# Patient Record
Sex: MALE | Race: WHITE | NOT HISPANIC OR LATINO | Employment: UNEMPLOYED | URBAN - METROPOLITAN AREA
[De-identification: names, ages, dates, MRNs, and addresses within clinical notes are randomized per-mention and may not be internally consistent; named-entity substitution may affect disease eponyms.]

---

## 2018-03-20 ENCOUNTER — RESOLUTE PROFESSIONAL BILLING HOSPITAL PROF FEE (OUTPATIENT)
Dept: HOSPITALIST | Facility: MEDICAL CENTER | Age: 63
End: 2018-03-20
Payer: COMMERCIAL

## 2018-03-20 ENCOUNTER — APPOINTMENT (OUTPATIENT)
Dept: RADIOLOGY | Facility: MEDICAL CENTER | Age: 63
DRG: 872 | End: 2018-03-20
Attending: EMERGENCY MEDICINE
Payer: COMMERCIAL

## 2018-03-20 ENCOUNTER — HOSPITAL ENCOUNTER (INPATIENT)
Facility: MEDICAL CENTER | Age: 63
LOS: 1 days | DRG: 872 | End: 2018-03-21
Attending: EMERGENCY MEDICINE | Admitting: INTERNAL MEDICINE
Payer: COMMERCIAL

## 2018-03-20 DIAGNOSIS — D69.6 THROMBOCYTOPENIA (HCC): ICD-10-CM

## 2018-03-20 DIAGNOSIS — D64.9 ANEMIA, UNSPECIFIED TYPE: ICD-10-CM

## 2018-03-20 PROBLEM — C85.90 LYMPHOMA (HCC): Status: ACTIVE | Noted: 2018-03-20

## 2018-03-20 PROBLEM — A41.9 SEPSIS (HCC): Status: ACTIVE | Noted: 2018-03-20

## 2018-03-20 PROBLEM — I95.9 HYPOTENSION: Status: ACTIVE | Noted: 2018-03-20

## 2018-03-20 PROBLEM — D61.818 PANCYTOPENIA (HCC): Status: ACTIVE | Noted: 2018-03-20

## 2018-03-20 LAB
ABO GROUP BLD: NORMAL
ABO GROUP BLD: NORMAL
ALBUMIN SERPL BCP-MCNC: 3.1 G/DL (ref 3.2–4.9)
ALBUMIN/GLOB SERPL: 1.2 G/DL
ALP SERPL-CCNC: 82 U/L (ref 30–99)
ALT SERPL-CCNC: 28 U/L (ref 2–50)
ANION GAP SERPL CALC-SCNC: 5 MMOL/L (ref 0–11.9)
AST SERPL-CCNC: 18 U/L (ref 12–45)
BARCODED ABORH UBTYP: 9500
BARCODED PRD CODE UBPRD: NORMAL
BARCODED UNIT NUM UBUNT: NORMAL
BASOPHILS # BLD AUTO: 0 % (ref 0–1.8)
BASOPHILS # BLD: 0 K/UL (ref 0–0.12)
BILIRUB SERPL-MCNC: 1.2 MG/DL (ref 0.1–1.5)
BLD GP AB INVEST PLASRBC-IMP: NORMAL
BLD GP AB SCN SERPL QL: NORMAL
BUN SERPL-MCNC: 16 MG/DL (ref 8–22)
CALCIUM SERPL-MCNC: 8.6 MG/DL (ref 8.4–10.2)
CHLORIDE SERPL-SCNC: 105 MMOL/L (ref 96–112)
CO2 SERPL-SCNC: 26 MMOL/L (ref 20–33)
COMPONENT R 8504R: NORMAL
CREAT SERPL-MCNC: 0.75 MG/DL (ref 0.5–1.4)
EOSINOPHIL # BLD AUTO: 0.02 K/UL (ref 0–0.51)
EOSINOPHIL NFR BLD: 1.3 % (ref 0–6.9)
ERYTHROCYTE [DISTWIDTH] IN BLOOD BY AUTOMATED COUNT: 58.8 FL (ref 35.9–50)
GLOBULIN SER CALC-MCNC: 2.5 G/DL (ref 1.9–3.5)
GLUCOSE SERPL-MCNC: 94 MG/DL (ref 65–99)
HCT VFR BLD AUTO: 19.5 % (ref 42–52)
HGB BLD-MCNC: 6.3 G/DL (ref 14–18)
IMM GRANULOCYTES # BLD AUTO: 0.4 K/UL (ref 0–0.11)
IMM GRANULOCYTES NFR BLD AUTO: 26 % (ref 0–0.9)
LYMPHOCYTES # BLD AUTO: 0.36 K/UL (ref 1–4.8)
LYMPHOCYTES NFR BLD: 23.4 % (ref 22–41)
MCH RBC QN AUTO: 29.2 PG (ref 27–33)
MCHC RBC AUTO-ENTMCNC: 32.3 G/DL (ref 33.7–35.3)
MCV RBC AUTO: 90.3 FL (ref 81.4–97.8)
MONOCYTES # BLD AUTO: 0.37 K/UL (ref 0–0.85)
MONOCYTES NFR BLD AUTO: 24 % (ref 0–13.4)
NEUTROPHILS # BLD AUTO: 0.39 K/UL (ref 1.82–7.42)
NEUTROPHILS NFR BLD: 25.3 % (ref 44–72)
NRBC # BLD AUTO: 0.02 K/UL
NRBC BLD-RTO: 1.3 /100 WBC
PLATELET # BLD AUTO: 17 K/UL (ref 164–446)
PMV BLD AUTO: 10.4 FL (ref 9–12.9)
POTASSIUM SERPL-SCNC: 3.9 MMOL/L (ref 3.6–5.5)
PRODUCT TYPE UPROD: NORMAL
PROT SERPL-MCNC: 5.6 G/DL (ref 6–8.2)
RBC # BLD AUTO: 2.16 M/UL (ref 4.7–6.1)
RH BLD: NORMAL
RH BLD: NORMAL
SODIUM SERPL-SCNC: 136 MMOL/L (ref 135–145)
UNIT STATUS USTAT: NORMAL
WBC # BLD AUTO: 1.6 K/UL (ref 4.8–10.8)

## 2018-03-20 PROCEDURE — 700102 HCHG RX REV CODE 250 W/ 637 OVERRIDE(OP)

## 2018-03-20 PROCEDURE — 36415 COLL VENOUS BLD VENIPUNCTURE: CPT

## 2018-03-20 PROCEDURE — A9270 NON-COVERED ITEM OR SERVICE: HCPCS | Performed by: EMERGENCY MEDICINE

## 2018-03-20 PROCEDURE — 700111 HCHG RX REV CODE 636 W/ 250 OVERRIDE (IP): Performed by: INTERNAL MEDICINE

## 2018-03-20 PROCEDURE — 700105 HCHG RX REV CODE 258

## 2018-03-20 PROCEDURE — 85025 COMPLETE CBC W/AUTO DIFF WBC: CPT

## 2018-03-20 PROCEDURE — 99291 CRITICAL CARE FIRST HOUR: CPT

## 2018-03-20 PROCEDURE — 700102 HCHG RX REV CODE 250 W/ 637 OVERRIDE(OP): Performed by: EMERGENCY MEDICINE

## 2018-03-20 PROCEDURE — 80053 COMPREHEN METABOLIC PANEL: CPT

## 2018-03-20 PROCEDURE — 99291 CRITICAL CARE FIRST HOUR: CPT | Performed by: INTERNAL MEDICINE

## 2018-03-20 PROCEDURE — 86900 BLOOD TYPING SEROLOGIC ABO: CPT

## 2018-03-20 PROCEDURE — A9270 NON-COVERED ITEM OR SERVICE: HCPCS | Performed by: INTERNAL MEDICINE

## 2018-03-20 PROCEDURE — 86870 RBC ANTIBODY IDENTIFICATION: CPT

## 2018-03-20 PROCEDURE — 86850 RBC ANTIBODY SCREEN: CPT

## 2018-03-20 PROCEDURE — 700105 HCHG RX REV CODE 258: Performed by: INTERNAL MEDICINE

## 2018-03-20 PROCEDURE — 700102 HCHG RX REV CODE 250 W/ 637 OVERRIDE(OP): Performed by: INTERNAL MEDICINE

## 2018-03-20 PROCEDURE — 770022 HCHG ROOM/CARE - ICU (200)

## 2018-03-20 PROCEDURE — 700111 HCHG RX REV CODE 636 W/ 250 OVERRIDE (IP)

## 2018-03-20 PROCEDURE — A9270 NON-COVERED ITEM OR SERVICE: HCPCS

## 2018-03-20 PROCEDURE — 86901 BLOOD TYPING SEROLOGIC RH(D): CPT

## 2018-03-20 PROCEDURE — 700105 HCHG RX REV CODE 258: Performed by: EMERGENCY MEDICINE

## 2018-03-20 RX ORDER — PROMETHAZINE HYDROCHLORIDE 25 MG/1
12.5-25 TABLET ORAL EVERY 4 HOURS PRN
Status: DISCONTINUED | OUTPATIENT
Start: 2018-03-20 | End: 2018-03-21 | Stop reason: HOSPADM

## 2018-03-20 RX ORDER — DIPHENHYDRAMINE HYDROCHLORIDE 50 MG/ML
25 INJECTION INTRAMUSCULAR; INTRAVENOUS ONCE
Status: COMPLETED | OUTPATIENT
Start: 2018-03-20 | End: 2018-03-20

## 2018-03-20 RX ORDER — SODIUM CHLORIDE 9 MG/ML
1000 INJECTION, SOLUTION INTRAVENOUS ONCE
Status: COMPLETED | OUTPATIENT
Start: 2018-03-20 | End: 2018-03-20

## 2018-03-20 RX ORDER — ACYCLOVIR 400 MG/1
400 TABLET ORAL 2 TIMES DAILY
COMMUNITY

## 2018-03-20 RX ORDER — SODIUM CHLORIDE 9 MG/ML
30 INJECTION, SOLUTION INTRAVENOUS
Status: DISCONTINUED | OUTPATIENT
Start: 2018-03-20 | End: 2018-03-21 | Stop reason: HOSPADM

## 2018-03-20 RX ORDER — ONDANSETRON 4 MG/1
4 TABLET, ORALLY DISINTEGRATING ORAL EVERY 4 HOURS PRN
Status: DISCONTINUED | OUTPATIENT
Start: 2018-03-20 | End: 2018-03-21 | Stop reason: HOSPADM

## 2018-03-20 RX ORDER — AMOXICILLIN 250 MG
2 CAPSULE ORAL 2 TIMES DAILY
Status: DISCONTINUED | OUTPATIENT
Start: 2018-03-20 | End: 2018-03-21 | Stop reason: HOSPADM

## 2018-03-20 RX ORDER — ONDANSETRON 2 MG/ML
4 INJECTION INTRAMUSCULAR; INTRAVENOUS EVERY 4 HOURS PRN
Status: DISCONTINUED | OUTPATIENT
Start: 2018-03-20 | End: 2018-03-21 | Stop reason: HOSPADM

## 2018-03-20 RX ORDER — ACETAMINOPHEN 325 MG/1
650 TABLET ORAL EVERY 6 HOURS PRN
Status: DISCONTINUED | OUTPATIENT
Start: 2018-03-20 | End: 2018-03-21 | Stop reason: HOSPADM

## 2018-03-20 RX ORDER — ACYCLOVIR 200 MG/1
400 CAPSULE ORAL 2 TIMES DAILY
Status: DISCONTINUED | OUTPATIENT
Start: 2018-03-20 | End: 2018-03-21 | Stop reason: HOSPADM

## 2018-03-20 RX ORDER — SODIUM CHLORIDE 9 MG/ML
500 INJECTION, SOLUTION INTRAVENOUS
Status: DISCONTINUED | OUTPATIENT
Start: 2018-03-20 | End: 2018-03-21 | Stop reason: HOSPADM

## 2018-03-20 RX ORDER — POLYETHYLENE GLYCOL 3350 17 G/17G
1 POWDER, FOR SOLUTION ORAL
Status: DISCONTINUED | OUTPATIENT
Start: 2018-03-20 | End: 2018-03-21 | Stop reason: HOSPADM

## 2018-03-20 RX ORDER — PREDNISONE 5 MG/1
10 TABLET ORAL EVERY EVENING
Status: DISCONTINUED | OUTPATIENT
Start: 2018-03-20 | End: 2018-03-21 | Stop reason: HOSPADM

## 2018-03-20 RX ORDER — PREDNISONE 10 MG/1
10 TABLET ORAL EVERY EVENING
COMMUNITY

## 2018-03-20 RX ORDER — DIPHENHYDRAMINE HCL 25 MG
TABLET ORAL
Status: COMPLETED
Start: 2018-03-20 | End: 2018-03-20

## 2018-03-20 RX ORDER — SODIUM CHLORIDE 9 MG/ML
INJECTION, SOLUTION INTRAVENOUS CONTINUOUS
Status: DISCONTINUED | OUTPATIENT
Start: 2018-03-20 | End: 2018-03-21 | Stop reason: HOSPADM

## 2018-03-20 RX ORDER — BISACODYL 10 MG
10 SUPPOSITORY, RECTAL RECTAL
Status: DISCONTINUED | OUTPATIENT
Start: 2018-03-20 | End: 2018-03-21 | Stop reason: HOSPADM

## 2018-03-20 RX ORDER — ACETAMINOPHEN 325 MG/1
650 TABLET ORAL ONCE
Status: COMPLETED | OUTPATIENT
Start: 2018-03-20 | End: 2018-03-20

## 2018-03-20 RX ORDER — M-VIT,TX,IRON,MINS/CALC/FOLIC 27MG-0.4MG
1 TABLET ORAL 2 TIMES DAILY
COMMUNITY

## 2018-03-20 RX ORDER — PROMETHAZINE HYDROCHLORIDE 25 MG/1
12.5-25 SUPPOSITORY RECTAL EVERY 4 HOURS PRN
Status: DISCONTINUED | OUTPATIENT
Start: 2018-03-20 | End: 2018-03-21 | Stop reason: HOSPADM

## 2018-03-20 RX ADMIN — ACYCLOVIR 400 MG: 200 CAPSULE ORAL at 22:15

## 2018-03-20 RX ADMIN — DIPHENHYDRAMINE HCL 25 MG: 25 TABLET ORAL at 23:48

## 2018-03-20 RX ADMIN — SODIUM CHLORIDE 1000 ML: 9 INJECTION, SOLUTION INTRAVENOUS at 19:07

## 2018-03-20 RX ADMIN — ACETAMINOPHEN 650 MG: 325 TABLET, FILM COATED ORAL at 23:47

## 2018-03-20 RX ADMIN — PIPERACILLIN SODIUM AND TAZOBACTAM SODIUM 3.38 G: 3; .375 INJECTION, POWDER, FOR SOLUTION INTRAVENOUS at 23:55

## 2018-03-20 RX ADMIN — SODIUM CHLORIDE: 9 INJECTION, SOLUTION INTRAVENOUS at 21:06

## 2018-03-20 RX ADMIN — PREDNISONE 10 MG: 5 TABLET ORAL at 22:15

## 2018-03-20 ASSESSMENT — ENCOUNTER SYMPTOMS
SPUTUM PRODUCTION: 0
NECK PAIN: 0
INSOMNIA: 0
SEIZURES: 0
NAUSEA: 0
PALPITATIONS: 0
DIARRHEA: 0
FEVER: 0
MYALGIAS: 0
EYE PAIN: 0
CHILLS: 0
ABDOMINAL PAIN: 0
EYE REDNESS: 0
ORTHOPNEA: 0
STRIDOR: 0
VOMITING: 0
DIZZINESS: 0
SHORTNESS OF BREATH: 0
NERVOUS/ANXIOUS: 0
BACK PAIN: 0
COUGH: 0
DEPRESSION: 0
EYE DISCHARGE: 0
HEARTBURN: 0
FOCAL WEAKNESS: 0
BLURRED VISION: 0
WEIGHT LOSS: 0
HEADACHES: 0

## 2018-03-20 ASSESSMENT — PAIN SCALES - GENERAL
PAINLEVEL_OUTOF10: 0
PAINLEVEL_OUTOF10: 0

## 2018-03-20 ASSESSMENT — PATIENT HEALTH QUESTIONNAIRE - PHQ9
1. LITTLE INTEREST OR PLEASURE IN DOING THINGS: NOT AT ALL
SUM OF ALL RESPONSES TO PHQ QUESTIONS 1-9: 0
SUM OF ALL RESPONSES TO PHQ9 QUESTIONS 1 AND 2: 0

## 2018-03-20 ASSESSMENT — LIFESTYLE VARIABLES
EVER_SMOKED: NEVER
ALCOHOL_USE: NO

## 2018-03-20 NOTE — ED PROVIDER NOTES
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    Patient is here with his wife. Patient lives in [place] and came to [place] to be treated by the [facility].  Patient was referred here today for a transfusion of blood and platelets based on outpatient labs.    Patient states she had a temperature to 100.4 yesterday. He received Neupogen and believes this caused his fever. Patient does not wish to be treated for neutropenic fever and the wife states the same thing. They show me a letter with a request of him to not be admitted as this will interrupt his current outpatient cancer treatment.      Patient denies vomiting, diarrhea, chills.    ALLERGIES  Allergies   Allergen Reactions   • Macrobid [Nitrofurantoin]        CURRENT MEDICATIONS  Home Medications     Reviewed by Win Guaman (Pharmacy Tech) on 03/20/18 at 1604  Med List Status: Complete   Medication Last Dose Status   acyclovir (ZOVIRAX) 400 MG tablet 3/20/2018 Active   Cholecalciferol (VITAMIN D PO) 3/19/2018 Active   IRON PO 3/20/2018 Active   MAGNESIUM PO 3/20/2018 Active   Non Formulary Request 3/19/2018 Active   Non Formulary Request 3/19/2018 Active   Non Formulary Request 3/19/2018 Active   Non Formulary Request 3/20/2018 Active   predniSONE (DELTASONE) 10 MG Tab 3/19/2018 Active   therapeutic multivitamin-minerals (THERAGRAN-M) Tab 3/20/2018 Active   VITAMIN K PO 3/20/2018 Active                PAST MEDICAL HISTORY     T-cell lymphoma    SURGICAL HISTORY  patient denies any surgical history    SOCIAL HISTORY    Lives in [place]      REVIEW OF SYSTEMS  All other systems are negative  See HPI for further details.  "      PHYSICAL EXAM  VITAL SIGNS: /56   Pulse (!) 109   Temp 37.2 °C (98.9 °F)   Resp 16   Ht 1.753 m (5' 9\")   Wt 61 kg (134 lb 7.7 oz)   SpO2 92%   BMI 19.86 kg/m²     General:  WD thin, nontoxic appearing in NAD; A+Ox3; V/S as above; tachycardic at triage, afebrile  Skin: warm and dry; pale color; no rash  HEENT: NCAT; EOMs intact; PERRL; no scleral icterus   Neck: FROM; no meningismus  Cardiovascular: Regular heart rate and rhythm.  No murmurs, rubs, or gallops; pulses 2+ bilaterally radially and DP areas  Lungs: Clear to auscultation with good air movement bilaterally.  No wheezes, rhonchi, or rales.   Abdomen: BS present; soft; NTND; no rebound, guarding, or rigidity.  No organomegaly or pulsatile mass; no CVAT   Extremities: WHEELER x 4; no e/o trauma; no pedal edema  Neurologic: CNs III-XII grossly intact; speech clear  Psychiatric: Appropriate affect, normal mood    LABS  Results for orders placed or performed during the hospital encounter of 03/20/18   CBC with Differential   Result Value Ref Range    WBC 1.6 (LL) 4.8 - 10.8 K/uL    RBC 2.16 (L) 4.70 - 6.10 M/uL    Hemoglobin 6.3 (L) 14.0 - 18.0 g/dL    Hematocrit 19.5 (L) 42.0 - 52.0 %    MCV 90.3 81.4 - 97.8 fL    MCH 29.2 27.0 - 33.0 pg    MCHC 32.3 (L) 33.7 - 35.3 g/dL    RDW 58.8 (H) 35.9 - 50.0 fL    Platelet Count 17 (LL) 164 - 446 K/uL    MPV 10.4 9.0 - 12.9 fL    Neutrophils-Polys 25.30 (L) 44.00 - 72.00 %    Lymphocytes 23.40 22.00 - 41.00 %    Monocytes 24.00 (H) 0.00 - 13.40 %    Eosinophils 1.30 0.00 - 6.90 %    Basophils 0.00 0.00 - 1.80 %    Immature Granulocytes 26.00 (H) 0.00 - 0.90 %    Nucleated RBC 1.30 /100 WBC    Neutrophils (Absolute) 0.39 (LL) 1.82 - 7.42 K/uL    Lymphs (Absolute) 0.36 (L) 1.00 - 4.80 K/uL    Monos (Absolute) 0.37 0.00 - 0.85 K/uL    Eos (Absolute) 0.02 0.00 - 0.51 K/uL    Baso (Absolute) 0.00 0.00 - 0.12 K/uL    Immature Granulocytes (abs) 0.40 (H) 0.00 - 0.11 K/uL    NRBC (Absolute) 0.02 K/uL   Comp " Metabolic Panel (CMP)   Result Value Ref Range    Sodium 136 135 - 145 mmol/L    Potassium 3.9 3.6 - 5.5 mmol/L    Chloride 105 96 - 112 mmol/L    Co2 26 20 - 33 mmol/L    Anion Gap 5.0 0.0 - 11.9    Glucose 94 65 - 99 mg/dL    Bun 16 8 - 22 mg/dL    Creatinine 0.75 0.50 - 1.40 mg/dL    Calcium 8.6 8.4 - 10.2 mg/dL    AST(SGOT) 18 12 - 45 U/L    ALT(SGPT) 28 2 - 50 U/L    Alkaline Phosphatase 82 30 - 99 U/L    Total Bilirubin 1.2 0.1 - 1.5 mg/dL    Albumin 3.1 (L) 3.2 - 4.9 g/dL    Total Protein 5.6 (L) 6.0 - 8.2 g/dL    Globulin 2.5 1.9 - 3.5 g/dL    A-G Ratio 1.2 g/dL   ESTIMATED GFR   Result Value Ref Range    GFR If African American >60 >60 mL/min/1.73 m 2    GFR If Non African American >60 >60 mL/min/1.73 m 2       MEDICAL RECORD  I have reviewed the patient's medical record and pertinent results as listed.      COURSE & MEDICAL DECISION MAKING  I have reviewed any medical record information, laboratory studies and radiographic results as noted.    Gurvinder Vivas is a 62 y.o. male with lymphoma who presents for transfusion. Patient is currently undergoing outpatient alternative chemotherapy through the [facility] cancer [place].  Patient states he had a fever last evening and believes this is due to the Neupogen he received. He does not wish to be evaluated for neutropenic fever. We have canceled the neutropenic fever workup labs per his request. Patient is aware he may have a bacterial, viral, or fungal infection but declines workup. He refused the chest x-ray. He reiterates that he is only here for a transfusion. He brings a letter that states he is not to be admitted. I advised the patient that we will attempt to transfuse him here in the ER but will need to confirm the anemia with our own labs and process the COD. He is amenable to this.    6:05 PM  Hemoglobin is 6.3, platelets 17  Transfusion of packed RBCs and platelets ordered.  Both were ordered to be irradiated and CMV negative.    7:01 PM  Patient  is noted to be hypotensive in the 70s. Upon reevaluation with this current blood pressure, he is sitting up in his stretcher and eating a meal. He denies dizziness. I advised him that we are still awaiting blood.  Patient has an antibody that will require further testing and a send out. I advised the patient that I will be admitting him for transfusion and to await the delivery of blood. This will reduce exposure of patients with flu and other contagious/infectious diseases to this patient who is currently neutropenic. The wife is insistent that they will be leaving by 7 AM. I explained that they may discuss that with the admitting doctor and sign out at any time.      I discussed the case with Dr. Hathaway who agrees to admit the patient.     The total critical care time on this patient is 40 minutes, resuscitating patient, speaking with admitting physician, and deciphering test results. This 40 minutes is exclusive of separately billable procedures.    FINAL IMPRESSION  1. Anemia, unspecified type    2. Thrombocytopenia (CMS-[facility])      Electronically signed by: Paola Moser, 3/20/2018 3:32 PM

## 2018-03-20 NOTE — ED NOTES
Med rec updated and complete  Allergie reviewed  Pts wife had a list of medications, went over list of medications and returned list of medications back to pts wife.  Pts wife reports no antibiotics in the last 30 days.

## 2018-03-20 NOTE — ED NOTES
This pt stated he had blood work today at a [facility] facility. No labs drawn at this time, will wait for ERP to evaluate. He also presented a note that his PCP asks that he will not be admitted.

## 2018-03-21 ENCOUNTER — PATIENT OUTREACH (OUTPATIENT)
Dept: HEALTH INFORMATION MANAGEMENT | Facility: OTHER | Age: 63
End: 2018-03-21

## 2018-03-21 VITALS
SYSTOLIC BLOOD PRESSURE: 93 MMHG | DIASTOLIC BLOOD PRESSURE: 55 MMHG | HEIGHT: 69 IN | TEMPERATURE: 98 F | BODY MASS INDEX: 19.62 KG/M2 | HEART RATE: 109 BPM | RESPIRATION RATE: 21 BRPM | WEIGHT: 132.5 LBS | OXYGEN SATURATION: 92 %

## 2018-03-21 LAB
ALBUMIN SERPL BCP-MCNC: 2.9 G/DL (ref 3.2–4.9)
ALBUMIN/GLOB SERPL: 1.2 G/DL
ALP SERPL-CCNC: 79 U/L (ref 30–99)
ALT SERPL-CCNC: 27 U/L (ref 2–50)
ANION GAP SERPL CALC-SCNC: 3 MMOL/L (ref 0–11.9)
APTT PPP: 29.3 SEC (ref 24.7–36)
AST SERPL-CCNC: 18 U/L (ref 12–45)
BARCODED ABORH UBTYP: 9500
BARCODED PRD CODE UBPRD: NORMAL
BARCODED UNIT NUM UBUNT: NORMAL
BILIRUB SERPL-MCNC: 1.8 MG/DL (ref 0.1–1.5)
BUN SERPL-MCNC: 12 MG/DL (ref 8–22)
CALCIUM SERPL-MCNC: 7.8 MG/DL (ref 8.4–10.2)
CHLORIDE SERPL-SCNC: 109 MMOL/L (ref 96–112)
CO2 SERPL-SCNC: 26 MMOL/L (ref 20–33)
COMPONENT P 8504P: NORMAL
CREAT SERPL-MCNC: 0.72 MG/DL (ref 0.5–1.4)
ERYTHROCYTE [DISTWIDTH] IN BLOOD BY AUTOMATED COUNT: 51.5 FL (ref 35.9–50)
GLOBULIN SER CALC-MCNC: 2.4 G/DL (ref 1.9–3.5)
GLUCOSE SERPL-MCNC: 82 MG/DL (ref 65–99)
HCT VFR BLD AUTO: 23 % (ref 42–52)
HGB BLD-MCNC: 7.6 G/DL (ref 14–18)
INR PPP: 1.05 (ref 0.87–1.13)
MCH RBC QN AUTO: 29.1 PG (ref 27–33)
MCHC RBC AUTO-ENTMCNC: 33 G/DL (ref 33.7–35.3)
MCV RBC AUTO: 88.1 FL (ref 81.4–97.8)
PLATELET # BLD AUTO: 31 K/UL (ref 164–446)
PMV BLD AUTO: 10.7 FL (ref 9–12.9)
POTASSIUM SERPL-SCNC: 3.7 MMOL/L (ref 3.6–5.5)
PRODUCT TYPE UPROD: NORMAL
PROT SERPL-MCNC: 5.3 G/DL (ref 6–8.2)
PROTHROMBIN TIME: 13.6 SEC (ref 12–14.6)
RBC # BLD AUTO: 2.61 M/UL (ref 4.7–6.1)
RH BLD: NORMAL
SODIUM SERPL-SCNC: 138 MMOL/L (ref 135–145)
UNIT STATUS USTAT: NORMAL
WBC # BLD AUTO: 2.8 K/UL (ref 4.8–10.8)

## 2018-03-21 PROCEDURE — 30233R1 TRANSFUSION OF NONAUTOLOGOUS PLATELETS INTO PERIPHERAL VEIN, PERCUTANEOUS APPROACH: ICD-10-PCS | Performed by: EMERGENCY MEDICINE

## 2018-03-21 PROCEDURE — 85610 PROTHROMBIN TIME: CPT

## 2018-03-21 PROCEDURE — 700111 HCHG RX REV CODE 636 W/ 250 OVERRIDE (IP)

## 2018-03-21 PROCEDURE — 700105 HCHG RX REV CODE 258

## 2018-03-21 PROCEDURE — 86901 BLOOD TYPING SEROLOGIC RH(D): CPT

## 2018-03-21 PROCEDURE — 700102 HCHG RX REV CODE 250 W/ 637 OVERRIDE(OP): Performed by: HOSPITALIST

## 2018-03-21 PROCEDURE — 86644 CMV ANTIBODY: CPT

## 2018-03-21 PROCEDURE — P9034 PLATELETS, PHERESIS: HCPCS

## 2018-03-21 PROCEDURE — 80053 COMPREHEN METABOLIC PANEL: CPT

## 2018-03-21 PROCEDURE — 700111 HCHG RX REV CODE 636 W/ 250 OVERRIDE (IP): Performed by: HOSPITALIST

## 2018-03-21 PROCEDURE — P9016 RBC LEUKOCYTES REDUCED: HCPCS

## 2018-03-21 PROCEDURE — A9270 NON-COVERED ITEM OR SERVICE: HCPCS | Performed by: HOSPITALIST

## 2018-03-21 PROCEDURE — 36430 TRANSFUSION BLD/BLD COMPNT: CPT

## 2018-03-21 PROCEDURE — 86922 COMPATIBILITY TEST ANTIGLOB: CPT | Mod: 91

## 2018-03-21 PROCEDURE — 85730 THROMBOPLASTIN TIME PARTIAL: CPT

## 2018-03-21 PROCEDURE — 99239 HOSP IP/OBS DSCHRG MGMT >30: CPT | Performed by: HOSPITALIST

## 2018-03-21 PROCEDURE — 30233N1 TRANSFUSION OF NONAUTOLOGOUS RED BLOOD CELLS INTO PERIPHERAL VEIN, PERCUTANEOUS APPROACH: ICD-10-PCS | Performed by: EMERGENCY MEDICINE

## 2018-03-21 PROCEDURE — 85027 COMPLETE CBC AUTOMATED: CPT

## 2018-03-21 RX ORDER — DIPHENHYDRAMINE HCL 25 MG
25 TABLET ORAL ONCE
Status: COMPLETED | OUTPATIENT
Start: 2018-03-21 | End: 2018-03-21

## 2018-03-21 RX ORDER — ACETAMINOPHEN 325 MG/1
650 TABLET ORAL ONCE
Status: COMPLETED | OUTPATIENT
Start: 2018-03-21 | End: 2018-03-21

## 2018-03-21 RX ORDER — LEVOFLOXACIN 500 MG/1
500 TABLET, FILM COATED ORAL DAILY
Qty: 5 TAB | Refills: 0 | Status: SHIPPED | OUTPATIENT
Start: 2018-03-21 | End: 2018-03-25

## 2018-03-21 RX ADMIN — PIPERACILLIN SODIUM AND TAZOBACTAM SODIUM 3.38 G: 3; .375 INJECTION, POWDER, FOR SOLUTION INTRAVENOUS at 02:58

## 2018-03-21 RX ADMIN — HEPARIN 500 UNITS: 100 SYRINGE at 10:08

## 2018-03-21 RX ADMIN — DIPHENHYDRAMINE HCL 25 MG: 25 TABLET ORAL at 04:09

## 2018-03-21 RX ADMIN — ACETAMINOPHEN 650 MG: 325 TABLET, FILM COATED ORAL at 04:09

## 2018-03-21 ASSESSMENT — PAIN SCALES - GENERAL
PAINLEVEL_OUTOF10: 0

## 2018-03-21 ASSESSMENT — LIFESTYLE VARIABLES: EVER_SMOKED: NEVER

## 2018-03-21 NOTE — ED NOTES
Marcus from Lab called with critical result of Platelets 17, WBC 1.6,  Hemaglobin  6.3 at 1759. Critical lab result read back to Marcus.   Dr. Moser notified of critical lab result at 1800.  Critical lab result read back by Dr. Moser

## 2018-03-21 NOTE — PROGRESS NOTES
0900: Dr. Licea at bedside, POC discussed with pt's wife and pt.     1000: Dr. Licea at bedside, new orders placed. Pt to be discharged home.     1040: Discharge instructions discussed with pt and pt's wife. Prescription given to wife.     1050: Pt discharged home with wife. Pt escorted out by CNA by wheelchair. Pt has all personal belongings.

## 2018-03-21 NOTE — PROGRESS NOTES
Report received from LALITA Dickson. Pt transferred to room 323 via ER [facility]. Pt in no apparent distress and denies any pain. Pt's wife, Brooke, at bedside. Admit in process.   POC discussed. Pt and wife refuse blood cultures and lactic acid draw at this time. State that they are only here for tonight for blood products and are hoping patient will be able to DC in AM.   Pt wishes to continue home medications. Call to Dr. Hathaway; MD ok'd pt to continue home meds prednisone and acyclovir and aware of pt's refusal of tests. Will continue with care.

## 2018-03-21 NOTE — ED NOTES
Iv is infusing at time of report. Plan of care discussed with Rn. He is eating dinner sitting upright.

## 2018-03-21 NOTE — CARE PLAN
Problem: Safety  Goal: Will remain free from falls    Intervention: Assess risk factors for falls  Bed in low position, pt near nurses station, treaded slipper socks in place, and call light in reach. Pt will remain free from falls.       Problem: Knowledge Deficit  Goal: Knowledge of disease process/condition, treatment plan, diagnostic tests, and medications will improve    Intervention: Explain information regarding disease process/condition, treatment plan, diagnostic tests, and medications and document in education  POC discussed with pt and pt's wife. Pt wanting to discharge today.       Problem: Discharge Barriers/Planning  Goal: Patient's continuum of care needs will be met    Intervention: Explain discharge instructions and medication reconcilliation to patient and significant other/support system  Discharge instructions discussed with pt and pt's wife. Prescription for Levaquin given to wife.

## 2018-03-21 NOTE — PROGRESS NOTES
"2 RN's at bedside to verify PRBCs. Pt states he has O negative blood and blood to be administered is O positive. Verified with Marcus, from blood bank. Unit of PRBCs returned to lab within 30 minutes. Marcus states that blood has a \"weak\" Rh + and that [facility] lab will prepare O negative blood for patient. Additional type and cross match sent for testing.   Call to [facility] blood bank- tech states that ordered blood will take about another hour or two to prepare. Wife and patient informed. Pt remains resting comfortably at this time and in no apparent distress. Will continue with care.   "

## 2018-03-21 NOTE — PROGRESS NOTES
Dinah from Lab called with critical result of platelets of 31 at 0845. Critical lab result read back to Dinah.   Dr. Licea notified of critical lab result at 0856.  Critical lab result read back by Dr. Licea.

## 2018-03-21 NOTE — PROGRESS NOTES
1 unit PRBCs started after 2 RN verification at bedside. Unable to barcode administer in epic; call to lab. Tech states to fill out paper form sent along with unit of blood. Paper verification form on chart; signed with LALITA Vásquez.

## 2018-03-21 NOTE — ASSESSMENT & PLAN NOTE
Labile BP  He is asymptomatic  Monitor closely for possible sepsis related hypotension  Likely related to severe anemia as well  On IVF  May need pressors if he continues to be hypotensive

## 2018-03-21 NOTE — PROGRESS NOTES
Unit of platelets started with 2 RN verification at bedside. Pt remains resting comfortably at this time.

## 2018-03-21 NOTE — ED NOTES
Assumed care of pt. Pt wife stating the pt is refusing to be admitted. Dr Moser aware. Per Dr. Moser, await Dr Hathaway to come see pt.

## 2018-03-21 NOTE — ASSESSMENT & PLAN NOTE
This is sepsis without acute organ dysfunction.  Could be related to severe anemia  He looks asymptomatic  Refused blood culture  Sepsis protocol  Patient refused to have any additional IV lines  Empirically started on IV abx

## 2018-03-21 NOTE — ED NOTES
ERP aware of pt VS. Awaiting hospitalist to see pt. Wife at bedside and is upset over wait time for blood products. Explained blood process to wife to ensure pt safety and prevent reaction. Wife and pt verbalized understanding. Pt continues to be on protective isolation.

## 2018-03-21 NOTE — ASSESSMENT & PLAN NOTE
History of lymphoma  With acute anemia: with hgb: 6.3, transfuse as needed with target HGB>7    Severe thrombocytopenia: platelet 17: transfuse irradiated platelet    Severe neutropenia: recently received neupogen    Follow cbc in am  See alternative medicine for his lymphoma

## 2018-03-21 NOTE — H&P
Hospital Medicine History and Physical      Date of Service  3/20/2018    Chief Complaint  Chief Complaint   Patient presents with   • Abnormal Labs     Low WBC 1.5  Ca pt T-Cell lymphoma Last chemo 3 wks ago  Fever lastnight post neupogen T-Max 100.4 has taken motrin today       History of Presenting Illness  Ortega is a 62 y.o. male PMH of lymphoma, pancytopenia, who presents with above. He is from [facility] cancer patient. He received blood and platelet transfusion 3 times a week. He is here to see another homeopathic practictioner for his lymphoma. He has chemo pot on his right chest. they stated that they wanted to get blood and platelets as she is tonight and diseased the hospital tomorrow morning since they have appointment with homeopathic practitioner in the morning.  I explained to them multiple time that the patient is currently hypotensive despite IV fluid and due to his severe neutropenia he may have underlying infection. But they insisted that he has to issue for the Past 6  Months and is just want to leave in the morning as soon as possible.  he wanted to be full code. He doesn't want to have blood culture done tonight since he didn't think that he has infection and refused to have lactic acid level checked.  She also stated that he noticed some black color stool but doesn't want to get occult blood test checked.    Primary Care Physician  Pcp Not In Computer      Code Status  Full code    Review of Systems  Review of Systems   Constitutional: Negative for chills, fever and weight loss.   HENT: Negative for congestion and nosebleeds.    Eyes: Negative for blurred vision, pain, discharge and redness.   Respiratory: Negative for cough, sputum production, shortness of breath and stridor.    Cardiovascular: Negative for chest pain, palpitations and orthopnea.   Gastrointestinal: Negative for abdominal pain, diarrhea, heartburn, nausea and vomiting.   Genitourinary: Negative for dysuria, frequency  "and urgency.   Musculoskeletal: Negative for back pain, myalgias and neck pain.   Skin: Negative for itching and rash.   Neurological: Negative for dizziness, focal weakness, seizures and headaches.   Psychiatric/Behavioral: Negative for depression. The patient is not nervous/anxious and does not have insomnia.      Please see HPI, all other systems were reviewed and are negative (AMA/CMS criteria)     Past Medical History  Lymphoma  pancytopenia    Surgical History  Chemo port on right chest    Medications  Acyclovir   Paranasal   Family History  No pertinent family history      Social History  Social History   Substance Use Topics   • Smoking status: Not on file   • Smokeless tobacco: Not on file   • Alcohol use Not on file       Allergies  Allergies   Allergen Reactions   • Macrobid [Nitrofurantoin] Rash     \"Body Rash\".          Physical Exam  Laboratory   Hemodynamics  Temp (24hrs), Av.2 °C (98.9 °F), Min:37.2 °C (98.9 °F), Max:37.2 °C (98.9 °F)   Temperature: 37.2 °C (98.9 °F)  Pulse  Av.2  Min: 95  Max: 109    Blood Pressure: 101/56, NIBP: 102/55      Respiratory      Respiration: 16, Pulse Oximetry: 96 %             Physical Exam   Constitutional: He is oriented to person, place, and time. No distress.   HENT:   Head: Normocephalic and atraumatic.   Mouth/Throat: Oropharynx is clear and moist.   Eyes: Conjunctivae and EOM are normal. Pupils are equal, round, and reactive to light.   Neck: Normal range of motion. Neck supple. No tracheal deviation present. No thyromegaly present.   Cardiovascular: Normal rate and regular rhythm.    No murmur heard.  Pulmonary/Chest: Effort normal and breath sounds normal. No respiratory distress. He has no wheezes.   Abdominal: Soft. Bowel sounds are normal. He exhibits no distension. There is no tenderness.   Musculoskeletal: He exhibits no edema or tenderness.   Neurological: He is alert and oriented to person, place, and time. No cranial nerve deficit.   Skin: " Skin is warm and dry. He is not diaphoretic. No erythema.   Psychiatric: He has a normal mood and affect. His behavior is normal. Thought content normal.       Recent Labs      03/20/18   1657   WBC  1.6*   RBC  2.16*   HEMOGLOBIN  6.3*   HEMATOCRIT  19.5*   MCV  90.3   MCH  29.2   MCHC  32.3*   RDW  58.8*   PLATELETCT  17*   MPV  10.4     Recent Labs      03/20/18   1657   SODIUM  136   POTASSIUM  3.9   CHLORIDE  105   CO2  26   GLUCOSE  94   BUN  16   CREATININE  0.75   CALCIUM  8.6     Recent Labs      03/20/18   1657   ALTSGPT  28   ASTSGOT  18   ALKPHOSPHAT  82   TBILIRUBIN  1.2   GLUCOSE  94                 No results found for: TROPONINI    Imaging  No orders to display          Assessment/Plan     I anticipate this patient will require at least two midnights for appropriate medical management, necessitating inpatient admission.    Pancytopenia (CMS-HCC)- (present on admission)   Assessment & Plan    History of lymphoma  With acute anemia: with hgb: 6.3, transfuse as needed with target HGB>7    Severe thrombocytopenia: platelet 17: transfuse irradiated platelet    Severe neutropenia: recently received neupogen    Follow cbc in am  See alternative medicine for his lymphoma        Hypotension   Assessment & Plan    Labile BP  He is asymptomatic  Monitor closely for possible sepsis related hypotension  Likely related to severe anemia as well  On IVF  May need pressors if he continues to be hypotensive          Sepsis (CMS-HCC)- (present on admission)   Assessment & Plan    This is sepsis without acute organ dysfunction.  Could be related to severe anemia  He looks asymptomatic  Refused blood culture  Sepsis protocol  Patient refused to have any additional IV lines  Empirically started on IV abx          Lymphoma (CMS-HCC)- (present on admission)   Assessment & Plan    The patient sees oncology in [place]  And he is seeking a homeopathic medicine here        Critical care time spent 44 minutes without  overlap, exclude procedure time.    Prophylaxis:  SCDs

## 2018-03-21 NOTE — ED NOTES
Lab called, Marcus, stated they need to sent lab out due to a specific antibody that showed up. ERP is ok with this, no stat blood now. ERP is aware of the BP, will give meds as indicated. Dr. Moser to talk with pt.

## 2018-03-21 NOTE — DISCHARGE INSTRUCTIONS
Discharge Instructions    Discharged to home by car with relative. Discharged via wheelchair, hospital escort: Yes.  Special equipment needed: Not Applicable    Be sure to schedule a follow-up appointment with your primary care doctor or any specialists as instructed.     Discharge Plan:   Diet Plan: Discussed  Activity Level: Discussed  Confirmed Follow up Appointment: Patient to Call and Schedule Appointment  Confirmed Symptoms Management: Discussed  Medication Reconciliation Updated: Yes  Influenza Vaccine Indication: Patient Refuses    I understand that a diet low in cholesterol, fat, and sodium is recommended for good health. Unless I have been given specific instructions below for another diet, I accept this instruction as my diet prescription.   Other diet: heart healthy    Special Instructions: None    · Is patient discharged on Warfarin / Coumadin?   No     Depression / Suicide Risk    As you are discharged from this [facility] facility, it is important to learn how to keep safe from harming yourself.    Recognize the warning signs:  · Abrupt changes in personality, positive or negative- including increase in energy   · Giving away possessions  · Change in eating patterns- significant weight changes-  positive or negative  · Change in sleeping patterns- unable to sleep or sleeping all the time   · Unwillingness or inability to communicate  · Depression  · Unusual sadness, discouragement and loneliness  · Talk of wanting to die  · Neglect of personal appearance   · Rebelliousness- reckless behavior  · Withdrawal from people/activities they love  · Confusion- inability to concentrate     If you or a loved one observes any of these behaviors or has concerns about self-harm, here's what you can do:  · Talk about it- your feelings and reasons for harming yourself  · Remove any means that you might use to hurt yourself (examples: pills, rope, extension cords, firearm)  · Get professional help from the  community (Mental Health, Substance Abuse, psychological counseling)  · Do not be alone:Call your Safe Contact- someone whom you trust who will be there for you.  · Call your local CRISIS HOTLINE 690-8945 or 855-055-4403  · Call your local Children's Mobile Crisis Response Team [place] (268) 154-5276 or www.[employer]  · Call the toll free National Suicide Prevention Hotlines   · National Suicide Prevention Lifeline 390-253-GMBO (8272)  · National Hope Line Network 800-SUICIDE (813-5686)    Levofloxacin tablets  What is this medicine?  LEVOFLOXACIN (sheryl voe FLOX a sin) is a quinolone antibiotic. It is used to treat certain kinds of bacterial infections. It will not work for colds, flu, or other viral infections.  This medicine may be used for other purposes; ask your health care provider or pharmacist if you have questions.  COMMON BRAND NAME(S): Levaquin, Levaquin Leva-Ja  What should I tell my health care provider before I take this medicine?  They need to know if you have any of these conditions:  -bone problems  -diabetes  -history of low levels of potassium in the blood  -irregular heartbeat  -joint problems  -kidney disease  -liver disease  -myasthenia gravis  -seizures  -tendon problems  -tingling of the fingers or toes, or other nerve disorder  -an unusual or allergic reaction to levofloxacin, other quinolone antibiotics, foods, dyes, or preservatives  -pregnant or trying to get pregnant  -breast-feeding  How should I use this medicine?  Take this medicine by mouth with a full glass of water. Follow the directions on the prescription label. This medicine can be taken with or without food. Take your medicine at regular intervals. Do not take your medicine more often than directed. Do not skip doses or stop your medicine early even if you feel better. Do not stop taking except on your doctor's advice.  A special MedGuide will be given to you by the pharmacist with each prescription and refill. Be  sure to read this information carefully each time.  Talk to your pediatrician regarding the use of this medicine in children. While this drug may be prescribed for children as young as 6 months for selected conditions, precautions do apply.  Overdosage: If you think you have taken too much of this medicine contact a poison control center or emergency room at once.  NOTE: This medicine is only for you. Do not share this medicine with others.  What if I miss a dose?  If you miss a dose, take it as soon as you remember. If it is almost time for your next dose, take only that dose. Do not take double or extra doses.  What may interact with this medicine?  Do not take this medicine with any of the following medications:  -bepridil  -certain medicines for depression, anxiety, or psychotic disturbances like pimozide, thioridazine, and ziprasidone  -certain medicines for irregular heart beat like dofetilide and dronedarone  -cisapride  -halofantrine  This medicine may also interact with the following medications:  -antacids  -birth control pills  -certain medicines for diabetes, like glipizide, glyburide, or insulin  -didanosine buffered tablets or powder  -multivitamins  -NSAIDS, medicines for pain and inflammation, like ibuprofen or naproxen  -steroid medicines like prednisone or cortisone  -sucralfate  -theophylline  -warfarin  This list may not describe all possible interactions. Give your health care provider a list of all the medicines, herbs, non-prescription drugs, or dietary supplements you use. Also tell them if you smoke, drink alcohol, or use illegal drugs. Some items may interact with your medicine.  What should I watch for while using this medicine?  Tell your doctor or healthcare professional if your symptoms do not start to get better or if they get worse.  Do not treat diarrhea with over the counter products. Contact your doctor if you have diarrhea that lasts more than 2 days or if it is severe and  watery.  Check with your doctor or health care professional if you get an attack of severe diarrhea, nausea and vomiting, or if you sweat a lot. The loss of too much body fluid can make it dangerous for you to take this medicine.  You may get drowsy or dizzy. Do not drive, use machinery, or do anything that needs mental alertness until you know how this medicine affects you. Do not sit or stand up quickly, especially if you are an older patient. This reduces the risk of dizzy or fainting spells.  This medicine can make you more sensitive to the sun. Keep out of the sun. If you cannot avoid being in the sun, wear protective clothing and use a sunscreen. Do not use sun lamps or tanning beds/booths. Contact your doctor if you get a sunburn.  If you are a diabetic monitor your blood glucose carefully. If you get an unusual reading stop taking this medicine and call your doctor right away.  Avoid antacids, calcium, iron, and zinc products for 2 hours before and 2 hours after taking a dose of this medicine.  What side effects may I notice from receiving this medicine?  Side effects that you should report to your doctor or health care professional as soon as possible:  -allergic reactions like skin rash or hives, swelling of the face, lips, or tongue  -anxious  -breathing problems  -confusion  -depressed mood  -diarrhea  -dizziness  -fast, irregular heartbeat  -hallucination, loss of contact with reality  -joint, muscle, or tendon pain or swelling  -muscle weakness  -pain, tingling, numbness in the hands or feet  -seizures  -signs and symptoms of high blood sugar such as dizziness; dry mouth; dry skin; fruity breath; nausea; stomach pain; increased hunger or thirst; increased urination  -signs and symptoms of liver injury like dark yellow or brown urine; general ill feeling or flu-like symptoms; light-colored stools; loss of appetite; nausea; right upper belly pain; unusually weak or tired; yellowing of the eyes or  skin  -signs and symptoms of low blood sugar such as feeling anxious; confusion; dizziness; increased hunger; unusually weak or tired; sweating; shakiness; cold; irritable; headache; blurred vision; fast heartbeat; loss of consciousness  -suicidal thoughts or other mood changes  -sunburn  -unusually weak or tired  Side effects that usually do not require medical attention (report to your doctor or health care professional if they continue or are bothersome):  -constipation  -dry mouth  -headache  -nausea, vomiting  -trouble sleeping  This list may not describe all possible side effects. Call your doctor for medical advice about side effects. You may report side effects to FDA at 0-546-BVP-5158.  Where should I keep my medicine?  Keep out of the reach of children.  Store at room temperature between 15 and 30 degrees C (59 and 86 degrees F). Keep in a tightly closed container. Throw away any unused medicine after the expiration date.  NOTE: This sheet is a summary. It may not cover all possible information. If you have questions about this medicine, talk to your doctor, pharmacist, or health care provider.  © 2018 Elsevier/Gold Standard (2017-06-27 12:38:27)

## 2018-03-21 NOTE — PROGRESS NOTES
0700: Report received from night RN, POC discussed. Protective isolation in place. Wife at bedside. 1 unit PRBC completed.     0745: Assessment completed. Lines and gtts verified. Labs drawn. POC discussed with pt and pt's wife. Call light in reach. Protective isolation in place. Breakfast tray ordered per pt's preferences

## 2018-03-22 ENCOUNTER — PATIENT OUTREACH (OUTPATIENT)
Dept: HEALTH INFORMATION MANAGEMENT | Facility: OTHER | Age: 63
End: 2018-03-22

## 2018-03-22 NOTE — DOCUMENTATION QUERY
DOCUMENTATION QUERY    PROVIDERS: Please select “Cosign w/ note”to reply to query.    To better represent the severity of illness of your patient, please review the following information and exercise your independent professional judgment in responding to this query.     Pancytopenia is documented in the History and Physical and Discharge Summary. Based upon the clinical findings, risk factors, and treatment, can this diagnosis be further specified?    · Pancytopenia due to chemotherapy (specify drug, malignancy, and any adverse effects, i.e., fever or mucositis)   · Other drug induced pancytopenia (specify drug and any adverse effect, i.e., fever or mucositis)  · Other explanation of clinical findings  · Unable to determine           The medical record reflects the following:   Clinical Findings 62 yr old male w/hx of lymphoma - receives platelet transfusions 3 x / wk  -WBC 1.5  -last chemo 3 wks ago  -reported fever at home 100.4  -anemia hgb 6.3  -severe thrombocytopenia -> platelets 17  -severe neutropenia - recently received neupogen  -getting alternative tx for his lymphoma - locally    Treatment -IV abx  -transfusion PRBC's  -transfusion Platelets  -support care    Risk Factors 62 yrs old w/lymphoma receiving chemo and alternative tx for the lymphoma, refusing diagnostics and ongoing treatment in the hospital   Location within medical record  History and Physical, Discharge Summary and Lab Results      Thank you,   Joyce Felix RN  Clinical   Ext [phone]

## 2018-03-22 NOTE — DISCHARGE SUMMARY
Hospital Medicine Discharge Note     Admit Date:  3/20/2018       Discharge Date:   3/21/2018    Attending Physician:  Marcus Licea     Diagnoses (includes active and resolved):   Active Problems:    Pancytopenia (CMS-HCC) POA: Yes    Lymphoma (CMS-HCC) POA: Yes    Sepsis (CMS-HCC) POA: Yes    Hypotension POA: Unknown  Resolved Problems:    * No resolved hospital problems. *      Hospital Summary (Brief Narrative):       Ortega is a 62 y.o. male PMH of lymphoma, pancytopenia p/w pnacytopenia. He is from [facility] cancer patient. He normally receives lead and platelet transfusions three times per week. He is here to see another homeopathic practictioner for his lymphoma. He has chemo port on his right chest. Patient presented just to get blood transfusion.  He was admitted for workup and treatment. He was given his transfusions. However, he was also found to be hypotensive and had a fever. There was fear of neutropenic fever so he was started on Zosyn. Patient improved clinically as well as his counts. Patient was eager to discharge home in the morning so that he could go see his homeopathic practitioner. He and his wife were warned about the risks of infection as well as loss of limb and/or death. They were warned that outpatient antibiotics are not a full substitute for inpatient antibiotics and he may still develop any of these above risks despite taking oral antibiotics. He agreed but still preferred to be discharged. I did everything in my capability to ensure the patient had a safe discharge as I could not prescribe him outpatient IV antibiotics.    He plans on staying in [place] for one month for homeopathic naturopathic chemotherapy. He will try to get into infusion clinic for his need for ongoing RBC and platelet transfusions. The case was discussed with oncology but they did not feel comfortable taking on the patient as they do not know what type of metabolic infusions patient will be getting. Thus, the  only other possibility for the patient is to report to the ER for continued transfusions as needed.  The patient recovered much more quickly than anticipated on admission.     Consultants:      None    Procedures:        None    Discharge Medications:           Medication List      START taking these medications      Instructions   levoFLOXacin 500 MG tablet  Commonly known as:  LEVAQUIN   Take 1 Tab by mouth every day for 5 days.  Dose:  500 mg        CONTINUE taking these medications      Instructions   acyclovir 400 MG tablet  Commonly known as:  ZOVIRAX   Take 400 mg by mouth 2 times a day.  Dose:  400 mg     IRON PO   Take 1 Tab by mouth 2 Times a Day.  Dose:  1 Tab     MAGNESIUM PO   Take 1 Cap by mouth 2 Times a Day.  Dose:  1 Cap     Non Formulary Request   Take 1 Cap by mouth every day. Phyto Artemisinin (OTC) Pt takes Mon-Fri, NOT on Sat or Sun  Dose:  1 Cap     Non Formulary Request   Take 1 Cap by mouth See Admin Instructions. Artemix (OTC) Pt takes Mon-Fri, NOT on Sat or Sun  Dose:  1 Cap     Non Formulary Request   Take 1 Tab by mouth See Admin Instructions. Pt takes Mon-Fri, NOT on Sat or Sun SUPER ARTEMISININ (OTC)  Dose:  1 Tab     Non Formulary Request   Take 1,200 mg by mouth 2 Times a Day. Borage (OTC)  Dose:  1200 mg     predniSONE 10 MG Tabs  Commonly known as:  DELTASONE   Take 10 mg by mouth every evening.  Dose:  10 mg     therapeutic multivitamin-minerals Tabs   Take 1 Tab by mouth 2 Times a Day.  Dose:  1 Tab     VITAMIN D PO   Take 1 Cap by mouth See Admin Instructions. On Mon, Wed, and Fri  Dose:  1 Cap     VITAMIN K PO   Take 1 mL by mouth every day.  Dose:  1 mL          Disposition:   Discharge home    Diet:   Regular    Activity:   As tolerated    Code status:   Full code    Primary Care Provider:    Pcp Not In Computer    Follow up appointment details :      William Richards D.O.  [address]  [facility] [id number] [phone]          Emergency Department      As needed    No  future appointments.    Pending Studies:        Repeat CBC at outside provider or ER    Time spent on discharge day patient visit: 42 minutes    #################################################    Interval history/exam for day of discharge:    Vitals:    03/21/18 1000 03/21/18 1010 03/21/18 1030 03/21/18 1045   BP:       Pulse: (!) 101  (!) 109    Resp: 14  (!) 21    Temp:       SpO2: 100% 94% 95% 92%   Weight:       Height:         Weight/BMI: Body mass index is 19.57 kg/m².  Pulse Oximetry: 92 %, O2 (LPM): 2, O2 Delivery: None (Room Air)    General:  NAD  CVS:  RRR  PULM:  CTAB, no respiratory distress    Most Recent Labs:    Lab Results   Component Value Date/Time    WBC 2.8 (L) 03/21/2018 07:55 AM    RBC 2.61 (L) 03/21/2018 07:55 AM    HEMOGLOBIN 7.6 (L) 03/21/2018 07:55 AM    HEMATOCRIT 23.0 (L) 03/21/2018 07:55 AM    MCV 88.1 03/21/2018 07:55 AM    MCH 29.1 03/21/2018 07:55 AM    MCHC 33.0 (L) 03/21/2018 07:55 AM    MPV 10.7 03/21/2018 07:55 AM    NEUTSPOLYS 25.30 (L) 03/20/2018 04:57 PM    LYMPHOCYTES 23.40 03/20/2018 04:57 PM    MONOCYTES 24.00 (H) 03/20/2018 04:57 PM    EOSINOPHILS 1.30 03/20/2018 04:57 PM    BASOPHILS 0.00 03/20/2018 04:57 PM      Lab Results   Component Value Date/Time    SODIUM 138 03/21/2018 07:55 AM    POTASSIUM 3.7 03/21/2018 07:55 AM    CHLORIDE 109 03/21/2018 07:55 AM    CO2 26 03/21/2018 07:55 AM    GLUCOSE 82 03/21/2018 07:55 AM    BUN 12 03/21/2018 07:55 AM    CREATININE 0.72 03/21/2018 07:55 AM      Lab Results   Component Value Date/Time    ALTSGPT 27 03/21/2018 07:55 AM    ASTSGOT 18 03/21/2018 07:55 AM    ALKPHOSPHAT 79 03/21/2018 07:55 AM    TBILIRUBIN 1.8 (H) 03/21/2018 07:55 AM    ALBUMIN 2.9 (L) 03/21/2018 07:55 AM    GLOBULIN 2.4 03/21/2018 07:55 AM    INR 1.05 03/21/2018 07:55 AM     Lab Results   Component Value Date/Time    PROTHROMBTM 13.6 03/21/2018 07:55 AM    INR 1.05 03/21/2018 07:55 AM        Imaging/ Testing:      No orders to display       Instructions:       The patient was instructed to return to the ER in the event of worsening symptoms. I have counseled the patient on the importance of compliance and the patient has agreed to proceed with all medical recommendations and follow up plan indicated above.   The patient understands that all medications come with benefits and risks. Risks may include permanent injury or death and these risks can be minimized with close reassessment and monitoring.

## 2018-03-25 ENCOUNTER — HOSPITAL ENCOUNTER (EMERGENCY)
Facility: MEDICAL CENTER | Age: 63
End: 2018-03-26
Attending: EMERGENCY MEDICINE
Payer: COMMERCIAL

## 2018-03-25 DIAGNOSIS — D61.818 PANCYTOPENIA (HCC): ICD-10-CM

## 2018-03-25 DIAGNOSIS — R53.83 LETHARGY: ICD-10-CM

## 2018-03-25 DIAGNOSIS — R50.9 FEVER, UNSPECIFIED FEVER CAUSE: ICD-10-CM

## 2018-03-25 DIAGNOSIS — C85.90 T-CELL LYMPHOMA (HCC): ICD-10-CM

## 2018-03-25 LAB
ABO GROUP BLD: NORMAL
ALBUMIN SERPL BCP-MCNC: 3.1 G/DL (ref 3.2–4.9)
ALBUMIN/GLOB SERPL: 1.2 G/DL
ALP SERPL-CCNC: 94 U/L (ref 30–99)
ALT SERPL-CCNC: 30 U/L (ref 2–50)
ANION GAP SERPL CALC-SCNC: 7 MMOL/L (ref 0–11.9)
ANISOCYTOSIS BLD QL SMEAR: ABNORMAL
AST SERPL-CCNC: 31 U/L (ref 12–45)
BARCODED ABORH UBTYP: 600
BARCODED ABORH UBTYP: 9500
BARCODED PRD CODE UBPRD: NORMAL
BARCODED PRD CODE UBPRD: NORMAL
BARCODED UNIT NUM UBUNT: NORMAL
BARCODED UNIT NUM UBUNT: NORMAL
BASOPHILS # BLD AUTO: 0 % (ref 0–1.8)
BASOPHILS # BLD: 0 K/UL (ref 0–0.12)
BILIRUB SERPL-MCNC: 1.3 MG/DL (ref 0.1–1.5)
BLD GP AB SCN SERPL QL: NORMAL
BUN SERPL-MCNC: 17 MG/DL (ref 8–22)
CALCIUM SERPL-MCNC: 8.7 MG/DL (ref 8.4–10.2)
CHLORIDE SERPL-SCNC: 100 MMOL/L (ref 96–112)
CO2 SERPL-SCNC: 23 MMOL/L (ref 20–33)
COMPONENT P 8504P: NORMAL
COMPONENT RCI 8504RCI: NORMAL
COMPONENT RCI 8504RCI: NORMAL
CREAT SERPL-MCNC: 0.91 MG/DL (ref 0.5–1.4)
EOSINOPHIL # BLD AUTO: 0.11 K/UL (ref 0–0.51)
EOSINOPHIL NFR BLD: 4 % (ref 0–6.9)
ERYTHROCYTE [DISTWIDTH] IN BLOOD BY AUTOMATED COUNT: 65.6 FL (ref 35.9–50)
GLOBULIN SER CALC-MCNC: 2.6 G/DL (ref 1.9–3.5)
GLUCOSE SERPL-MCNC: 91 MG/DL (ref 65–99)
HCT VFR BLD AUTO: 22.4 % (ref 42–52)
HGB BLD-MCNC: 7.3 G/DL (ref 14–18)
LYMPHOCYTES # BLD AUTO: 0.62 K/UL (ref 1–4.8)
LYMPHOCYTES NFR BLD: 23 % (ref 22–41)
MACROCYTES BLD QL SMEAR: ABNORMAL
MANUAL DIFF BLD: ABNORMAL
MCH RBC QN AUTO: 29.2 PG (ref 27–33)
MCHC RBC AUTO-ENTMCNC: 32.6 G/DL (ref 33.7–35.3)
MCV RBC AUTO: 89.6 FL (ref 81.4–97.8)
METAMYELOCYTES NFR BLD MANUAL: 2 %
MICROCYTES BLD QL SMEAR: ABNORMAL
MONOCYTES # BLD AUTO: 0.19 K/UL (ref 0–0.85)
MONOCYTES NFR BLD AUTO: 7 % (ref 0–13.4)
MYELOCYTES NFR BLD MANUAL: 1 %
NEUTROPHILS # BLD AUTO: 1.7 K/UL (ref 1.82–7.42)
NEUTROPHILS NFR BLD: 50 % (ref 44–72)
NEUTS BAND NFR BLD MANUAL: 13 % (ref 0–10)
NRBC # BLD AUTO: 0 K/UL
NRBC BLD-RTO: 0 /100 WBC
PLATELET # BLD AUTO: 21 K/UL (ref 164–446)
PLATELET BLD QL SMEAR: NORMAL
PLATELETS.RETICULATED NFR BLD AUTO: 7.1 K/UL (ref 0.6–13.1)
PMV BLD AUTO: 11.1 FL (ref 9–12.9)
POLYCHROMASIA BLD QL SMEAR: NORMAL
POTASSIUM SERPL-SCNC: 3.7 MMOL/L (ref 3.6–5.5)
PRODUCT TYPE UPROD: NORMAL
PRODUCT TYPE UPROD: NORMAL
PROT SERPL-MCNC: 5.7 G/DL (ref 6–8.2)
RBC # BLD AUTO: 2.5 M/UL (ref 4.7–6.1)
RBC BLD AUTO: PRESENT
RH BLD: NORMAL
SODIUM SERPL-SCNC: 130 MMOL/L (ref 135–145)
UNIT STATUS USTAT: NORMAL
UNIT STATUS USTAT: NORMAL
WBC # BLD AUTO: 2.7 K/UL (ref 4.8–10.8)

## 2018-03-25 PROCEDURE — 86850 RBC ANTIBODY SCREEN: CPT

## 2018-03-25 PROCEDURE — 700102 HCHG RX REV CODE 250 W/ 637 OVERRIDE(OP)

## 2018-03-25 PROCEDURE — A9270 NON-COVERED ITEM OR SERVICE: HCPCS

## 2018-03-25 PROCEDURE — P9034 PLATELETS, PHERESIS: HCPCS

## 2018-03-25 PROCEDURE — 80053 COMPREHEN METABOLIC PANEL: CPT

## 2018-03-25 PROCEDURE — P9016 RBC LEUKOCYTES REDUCED: HCPCS

## 2018-03-25 PROCEDURE — 86901 BLOOD TYPING SEROLOGIC RH(D): CPT

## 2018-03-25 PROCEDURE — 700105 HCHG RX REV CODE 258: Performed by: EMERGENCY MEDICINE

## 2018-03-25 PROCEDURE — 36430 TRANSFUSION BLD/BLD COMPNT: CPT

## 2018-03-25 PROCEDURE — A9270 NON-COVERED ITEM OR SERVICE: HCPCS | Performed by: EMERGENCY MEDICINE

## 2018-03-25 PROCEDURE — 85055 RETICULATED PLATELET ASSAY: CPT

## 2018-03-25 PROCEDURE — 85027 COMPLETE CBC AUTOMATED: CPT

## 2018-03-25 PROCEDURE — 85007 BL SMEAR W/DIFF WBC COUNT: CPT

## 2018-03-25 PROCEDURE — 86644 CMV ANTIBODY: CPT

## 2018-03-25 PROCEDURE — 86922 COMPATIBILITY TEST ANTIGLOB: CPT | Mod: 91

## 2018-03-25 PROCEDURE — 86900 BLOOD TYPING SEROLOGIC ABO: CPT

## 2018-03-25 PROCEDURE — 99285 EMERGENCY DEPT VISIT HI MDM: CPT

## 2018-03-25 PROCEDURE — 700102 HCHG RX REV CODE 250 W/ 637 OVERRIDE(OP): Performed by: EMERGENCY MEDICINE

## 2018-03-25 RX ORDER — HYDROCORTISONE ACETATE 25 MG/1
SUPPOSITORY RECTAL
Status: COMPLETED
Start: 2018-03-25 | End: 2018-03-25

## 2018-03-25 RX ORDER — LEVOFLOXACIN 500 MG/1
500 TABLET, FILM COATED ORAL DAILY
COMMUNITY
Start: 2018-03-20

## 2018-03-25 RX ORDER — HYDROCORTISONE ACETATE 25 MG/1
25 SUPPOSITORY RECTAL EVERY 12 HOURS
Status: DISCONTINUED | OUTPATIENT
Start: 2018-03-25 | End: 2018-03-26 | Stop reason: HOSPADM

## 2018-03-25 RX ORDER — DIPHENHYDRAMINE HCL 25 MG
TABLET ORAL
Status: COMPLETED
Start: 2018-03-25 | End: 2018-03-25

## 2018-03-25 RX ORDER — DIPHENHYDRAMINE HYDROCHLORIDE 50 MG/ML
25 INJECTION INTRAMUSCULAR; INTRAVENOUS ONCE
Status: COMPLETED | OUTPATIENT
Start: 2018-03-25 | End: 2018-03-25

## 2018-03-25 RX ORDER — ACETAMINOPHEN 325 MG/1
1000 TABLET ORAL ONCE
Status: COMPLETED | OUTPATIENT
Start: 2018-03-25 | End: 2018-03-25

## 2018-03-25 RX ORDER — SODIUM CHLORIDE 9 MG/ML
INJECTION, SOLUTION INTRAVENOUS CONTINUOUS
Status: DISCONTINUED | OUTPATIENT
Start: 2018-03-25 | End: 2018-03-26 | Stop reason: HOSPADM

## 2018-03-25 RX ADMIN — HYDROCORTISONE ACETATE 25 MG: 25 SUPPOSITORY RECTAL at 16:02

## 2018-03-25 RX ADMIN — ACETAMINOPHEN 975 MG: 325 TABLET, FILM COATED ORAL at 21:11

## 2018-03-25 RX ADMIN — DIPHENHYDRAMINE HCL 25 MG: 25 TABLET ORAL at 21:10

## 2018-03-25 RX ADMIN — SODIUM CHLORIDE: 9 INJECTION, SOLUTION INTRAVENOUS at 16:03

## 2018-03-25 ASSESSMENT — PAIN SCALES - GENERAL: PAINLEVEL_OUTOF10: 0

## 2018-03-25 NOTE — ED NOTES
Wearing mask. States receiving treatments for cancer. Next chemo is Monday. Here to see if needs transfusion of platelets or RBCs.

## 2018-03-25 NOTE — ED NOTES
Med rec complete per Pt at bedside  Allergies reviewed  Pt completed a 5 day course of Levofloxacin on 3/25

## 2018-03-25 NOTE — ED PROVIDER NOTES
ED Provider Note    CHIEF COMPLAINT  Chief Complaint   Patient presents with   • Sent by MD   • Tired   • Cancer       HPI  Gurvinder Vivas is a 62 y.o. 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 Evidently he was treated at the [facility] in [place] for a year, treated with chemotherapy there are last chemotherapy 3 weeks ago. Did have a stem cell transplant in September.. He decided to come to renal for some homeopathic treatment. He was given some medication 2 days ago that he does not know the name of. He was told that this medication could cause a fever. He has no idea what the medication is. He does complain of some lethargy. Although the reason he came in the emergency room was to have his blood count checked. He refuses any blood cultures refused a workup for sepsis even know he has been on chemotherapy and has known T-cell lymphoma.. The only lab he will allow me to do this a CBC and a CMP. Her coughing no dysuria urgency or frequency  Evidently he was admitted here about 5 days ago for platelets transfusion  REVIEW OF SYSTEMS  See HPI for further details. History of T-cell lymphoma, treated at the [facility], stem cell transplant in September, last chemotherapy from [facility] was 3 weeks ago. Treated with homeopathic treatment here in [place], last treatment 2 days agoDenies other G.I., G.U.. endrocine, cardiovascular, respriatory or neurological problems.  All other systems are negative.     PAST MEDICAL HISTORY  Past Medical History:   Diagnosis Date   • Cancer (CMS-[facility])        FAMILY HISTORY  History reviewed. No pertinent family history.    SOCIAL HISTORY  Social History     Social History   • Marital status:      Spouse name: N/A   • Number of children: N/A   • Years of education: N/A     Social History Main Topics   • Smoking status: Never Smoker   • Smokeless tobacco: Never Used   • Alcohol use No   • Drug use: Yes     Types: Oral       "Comment: oral medical marijuania   • Sexual activity: Not on file     Other Topics Concern   • Not on file     Social History Narrative   • No narrative on file       SURGICAL HISTORY  Past Surgical History:   Procedure Laterality Date   • PROSTATECTOMY, RADIAL         CURRENT MEDICATIONS  Home Medications     Reviewed by Win Adams (Pharmacy Tech) on 03/25/18 at 1324  Med List Status: Complete   Medication Last Dose Status   acyclovir (ZOVIRAX) 400 MG tablet 3/25/2018 Active   Cholecalciferol (VITAMIN D PO) 3/25/2018 Active   IRON PO 3/25/2018 Active   levoFLOXacin (LEVAQUIN) 500 MG tablet 3/25/2018 Active   MAGNESIUM PO 3/25/2018 Active   Non Formulary Request 3/23/2018 Active   Non Formulary Request 3/23/2018 Active   Non Formulary Request 3/23/2018 Active   Non Formulary Request 3/25/2018 Active   predniSONE (DELTASONE) 10 MG Tab 3/24/2018 Active   therapeutic multivitamin-minerals (THERAGRAN-M) Tab 3/25/2018 Active   VITAMIN K PO 3/25/2018 Active                ALLERGIES  Allergies   Allergen Reactions   • Macrobid [Nitrofurantoin] Rash     \"Body Rash\".         PHYSICAL EXAM  VITAL SIGNS: BP (!) 99/46   Pulse 99   Temp 37.2 °C (98.9 °F)   Resp 18   Wt 62.1 kg (136 lb 14.5 oz)   SpO2 89%   BMI 20.22 kg/m²    Constitutional: Well developed, Well nourished, No acute distress, Non-toxic appearance.   HENT: Normocephalic, Atraumatic, Bilateral external ears normal, Oropharynx moist, No oral exudates, Nose normal.   Eyes: PERRL, EOMI, Conjunctiva normal, No discharge.   Neck: Normal range of motion, No tenderness, Supple, No stridor.   Lymphatic: No lymphadenopathy noted.   Cardiovascular: Normal heart rate, Normal rhythm, No murmurs, No rubs, No gallops.   Thorax & Lungs: Normal breath sounds, No respiratory distress, No wheezing, No chest tenderness.   Abdomen:  No tenderness, no guarding no rigidity and the abdomen is soft.  No masses, No pulsatile masses.  Skin: Warm, Dry, No erythema, No " rash.   Back: No tenderness, No CVA tenderness.   Extremities: Intact distal pulses, No edema, No tenderness, No cyanosis, No clubbing.   Musculoskeletal: Good range of motion in all major joints. No tenderness to palpation or major deformities noted.   Neurologic: Alert & oriented x 3, Normal motor function, Normal sensory function, No focal deficits noted.   Psychiatric: Affect normal, Judgment normal, Mood normal.       RADIOLOGY/PROCEDURES      COURSE & MEDICAL DECISION MAKING  Pertinent Labs & Imaging studies reviewed. (See chart for details) YL, 2.7, hematocrit 22.4, platelet count 21,000. 50 neutrophils.  Electrolytes, unremarkable renal function normal liver function normal      As patient has a diagnosis, T-cell lymphoma, here earlier in the week for transfusion and platelets. Comes in once again concerned about his blood count. Blood count once again he appears to be profoundly anemic, thrombocytopenia, leukopenia, pancytopenia  Refuses any workup for infection even though he had a fever 2 days ago. He understands that overwhelming infection could be fatal but he still refuses workup for any infection. He is consenting for treatment of his anemia and thrombocytopenia. I will talk with hospitalist about admission for blood for transfusion, also platelets. This patient continues to decline any workup for fever or infection. Last time he was here he left the hospital his 70s he had his blood products, elected not to stay for anymore workup.. I have explained to him that we will do what we can to treat his deficiencies, anemia and thrombocytopenia, however combination of fever with leukocytopenia is a dangerous combination.. He states he intends to leave as soon as he gets his blood products, once again  FINAL IMPRESSION  1.   1. T-cell lymphoma (CMS-[facility])    2. Lethargy    3. Fever, unspecified fever cause    4. Pancytopenia (CMS-[facility])            2.   3.     Disposition have talked with hospitalist about  admission, Electronically signed by: Haris Morocho, 3/25/2018 1:49 PM

## 2018-03-25 NOTE — ED NOTES
Port to left chest accessed and blood in lab. Pt updated on plan of care. Denies any needs at this time. Skinpwd. Aa0x3. resp nonlabored. Family at bedside

## 2018-03-25 NOTE — ED NOTES
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 States would like platelets and blood counts checked. Reports \"I usually have to have blood or platelets every other day and I havent had anything since Wednesday.\" denies any new symptoms. States \"I feel weak but not any worse then usual\" skinpwd. Aa0x3. resp nonlabored. Family at bedside.   "

## 2018-03-25 NOTE — ED NOTES
Nette from Lab called with critical result of platelet count at 21. Critical lab result read back to Nette.   Dr. Morocho notified of critical lab result at 1455.  Critical lab result read back by Dr. Morocho.

## 2018-03-26 VITALS
SYSTOLIC BLOOD PRESSURE: 100 MMHG | OXYGEN SATURATION: 94 % | RESPIRATION RATE: 12 BRPM | HEART RATE: 83 BPM | DIASTOLIC BLOOD PRESSURE: 60 MMHG | BODY MASS INDEX: 20.22 KG/M2 | TEMPERATURE: 98.6 F | WEIGHT: 136.91 LBS

## 2018-03-26 PROCEDURE — 700111 HCHG RX REV CODE 636 W/ 250 OVERRIDE (IP)

## 2018-03-26 RX ADMIN — HEPARIN 500 UNITS: 100 SYRINGE at 00:22

## 2018-03-26 NOTE — ED NOTES
Pt updated on plan to wait for blood products. Denies any needs at this time, family member at bedside. Denies any needs. Skinpwd. Aa0x3. resp nonlabored. Will continue to monitor.

## 2018-03-26 NOTE — ED NOTES
PT updated on plan to wait for blood products. Resting in stretcher. Denies any needs. Skin pwd. Aa0x3. resp non labored.

## 2018-03-26 NOTE — ED NOTES
Platelets infusing, pt tolerating well. Denies any n/v, rash or fever. Skin pwd. Aa0x3. resp nonlabored. Will continue to monitor. Family at bedside.

## 2018-03-26 NOTE — ED NOTES
Left port de- accesed per protocol following blood transfusion completion.  Pt leaving AMA, risks reviewed with PT and paperwork signed.  Discharge information provided. Pt verbalized understanding of discharge instructions to follow up with PCP and to return to ER if condition worsens.  Pt ambulated out of ER in a steady gait.

## 2018-03-26 NOTE — ED NOTES
PT updated on plan of care. Resting in stretcher. Denies any needs at this time. Family at bedside, pt eating without difficulty. Skin pwd. Aa0x3. resp non labored. Will continue to monitor.

## 2018-03-26 NOTE — ED NOTES
Assumed patient care. Pt assesement done.  Plan of care reviewed with patient, family at bedside.  PRBC infusing, no reaction noted.  WCTM

## 2018-03-26 NOTE — ED NOTES
"Pt with low blood pressure to monitor, RN to room to assess. Pt sleeping soundly, easily aroused states \"that benadryl really made me sleepy\" blood pressure re taken and now back to baseline. Will continue to monitor. Aa0x3. resp nonlabored. Will continue to monitor.   "

## 2018-03-26 NOTE — DISCHARGE INSTRUCTIONS
"Return imediately for admission. Return at any time if you change your mind about admission. There is risk of death or severe disability.Return if fever, vomiting or if no better in 12 hours.Fever of Unknown Origin  Fever of \"unknown origin\" is a fever of at least 101° F (38.3° C) or greater, and that has gone on daily for three weeks. It is a fever which has a hidden cause. Fever is a higher-than-normal body temperature. Normal temperature is usually defined as 98.6° F or 37° C. Fever is a symptom, not a disease. A fever may mean that there is something else going on in the body that is causing it.  CAUSES  Fever can be caused by many conditions, including:   · Infections.   · Tissue injuries.   · Medicines.   · Different diseases.   · Being in hot surroundings.   · Tumors or cancers (this is a rare cause).   SYMPTOMS  The signs and symptoms of a fever depend on the cause. At first, a fever can cause a chill. When the brain raises the body's \"thermostat,\" the body responds by shivering to raise the temperature. Shivering produces heat in the body. Once the temperature goes up, the person often feels warm. When the fever goes away, the person may start to sweat.  DIAGNOSIS   There can be many causes of fever. Sometimes, the reason can be very difficult to find. Your caregiver may have to do numerous tests to track down the reason.  TREATMENT   · Medication may be used to control fever.   · Do not use aspirin because of the association with Reye's syndrome.   · If an infection is suspected to be causing the fever and medications have been prescribed, take them as directed. Finish the full course of medications until they are gone.   · Sponging or bathing in lukewarm water can cool the skin and reduce body temperature. Ice water or alcohol sponge baths are not as effective as lukewarm water and should not be used.   HOME CARE  · Continue to eat normally.   · Drink enough fluids to keep urine clear or pale yellow. "   · Broths, decaffeinated tea, decaffeinated soft drinks, and oral rehydration solutions (ORS) can help replace fluids and electrolytes.   · Keep all follow-up appointments as directed by your caregiver.   · Weigh yourself once a day. Write down the weights and bring them to your follow-up appointments to review with your caregiver.   SEEK IMMEDIATE MEDICAL CARE IF:   · You or your child is unable to keep fluids down.   · Vomiting or diarrhea develop or are present and become persistent (continued).   · There is excessive weakness, dizziness, fainting or extreme thirst.   · You have a fever or persistent symptoms for more than 72 hours.   · You have a fever and your symptoms suddenly get worse.   Document Released: 11/03/2005 Document Revised: 03/11/2013 Document Reviewed: 12/18/2006  "[employer]® Patient Information ©2013 [email or web address].    Fatigue  Introduction  Fatigue is feeling tired all of the time, a lack of energy, or a lack of motivation. Occasional or mild fatigue is often a normal response to activity or life in general. However, long-lasting (chronic) or extreme fatigue may indicate an underlying medical condition.  Follow these instructions at home:  Watch your fatigue for any changes. The following actions may help to lessen any discomfort you are feeling:  · Talk to your health care provider about how much sleep you need each night. Try to get the required amount every night.  · Take medicines only as directed by your health care provider.  · Eat a healthy and nutritious diet. Ask your health care provider if you need help changing your diet.  · Drink enough fluid to keep your urine clear or pale yellow.  · Practice ways of relaxing, such as yoga, meditation, massage therapy, or acupuncture.  · Exercise regularly.  · Change situations that cause you stress. Try to keep your work and personal routine reasonable.  · Do not abuse illegal drugs.  · Limit alcohol intake to no more than 1 drink per day for  nonpregnant women and 2 drinks per day for men. One drink equals 12 ounces of beer, 5 ounces of wine, or 1½ ounces of hard liquor.  · Take a multivitamin, if directed by your health care provider.  Contact a health care provider if:  · Your fatigue does not get better.  · You have a fever.  · You have unintentional weight loss or gain.  · You have headaches.  · You have difficulty:  ¨ Falling asleep.  ¨ Sleeping throughout the night.  · You feel angry, guilty, anxious, or sad.  · You are unable to have a bowel movement (constipation).  · You skin is dry.  · Your legs or another part of your body is swollen.  Get help right away if:  · You feel confused.  · Your vision is blurry.  · You feel faint or pass out.  · You have a severe headache.  · You have severe abdominal, pelvic, or back pain.  · You have chest pain, shortness of breath, or an irregular or fast heartbeat.  · You are unable to urinate or you urinate less than normal.  · You develop abnormal bleeding, such as bleeding from the rectum, vagina, nose, lungs, or nipples.  · You vomit blood.  · You have thoughts about harming yourself or committing suicide.  · You are worried that you might harm someone else.  This information is not intended to replace advice given to you by your health care provider. Make sure you discuss any questions you have with your health care provider.  Document Released: 10/14/2008 Document Revised: 05/25/2017 Document Reviewed: 04/21/2015  © 2017 [employer]  Neutropenic Fever  Neutropenic fever is a type of fever that can develop in someone who has a very low number of a certain kind of white blood cells called neutrophils (neutropenia). These blood cells are important for fighting infections caused by bacteria and fungi. When you have neutropenia, you could be in danger of a severe infection. You may need to start taking antibiotic medicines.  CAUSES  Neutrophils are made in the spongy tissue inside your bones (bone marrow).  Anything that damages your bone marrow or damages neutrophils after they leave your bone marrow can cause neutropenia. Once you have a dangerously low level of neutrophils, you are at risk for infection and neutropenic fever.  Causes of neutropenia may include:  · Cancer treatments.  · Bone marrow cancer.  · Cancer of the white blood cells (leukemia or myeloma).  · Severe infection.  · Bone marrow failure (aplastic anemia).  · Many types of medicines.  · Diseases of the body's defense system (autoimmune diseases).  · Inherited genes that cause neutropenia.  · Vitamin B deficiency.  · Spleen enlargement in rheumatoid arthritis (Felty syndrome).  SIGNS AND SYMPTOMS  Fever is the main symptom of neutropenic fever. Other signs and symptoms may include:  · Chills.  · Fatigue.  · Painful mouth ulcers.  · Cough.  · Shortness of breath.  · Swollen glands (lymph nodes).  · Sore throat.  · Sinus and ear infections.  · Gum disease.  · Skin infection.  · Burning and frequent urination.  · Rectal infections.  · Vaginal discharge or itching.  DIAGNOSIS   Your health care provider may diagnose neutropenic fever if your neutrophil count is less than 500 neutrophils per microliter of blood and you have a fever of at least 100.4°F (38.0°C).   · Blood tests and other tests that measure neutrophils will be done. These may include:  ¨ A complete blood count (CBC) and a differential white blood count (WBC).  ¨ Peripheral smear. This test involves checking a blood sample under a microscope.  · Other types of tests may also be done, including:  ¨ Chest X-rays.  ¨ Cultures of blood and body fluids to look for a source of infection.  Your health care provider will also determine if your neutropenic fever is high risk or low risk.  · You may have high-risk neutropenic fever if:  ¨ Your neutrophil count is less than 100 neutrophils per microliter of blood.  ¨ You have also been diagnosed with pneumonia or another serious medical  problem.  ¨ Your condition requires you to be treated in the hospital.  · You may have low-risk neutropenic fever if:  ¨ Your neutrophil count is more than 100 neutrophils per microliter of blood.  ¨ Your chest X-ray is normal.  ¨ You do not have an active illness or any other problems that require you to be in the hospital.  TREATMENT   You may start treatment as soon as you get diagnosed with neutropenic fever, even if your health care provider is still looking for the source of infection.  · Treatment for high-risk neutropenic fever is antibiotic medicine given through an IV access tube. This is done in the hospital. You may be given a single antibiotic or a combination of antibiotics.  · Low-risk neutropenic fever may be treated at home. You may have to take one or two different oral antibiotics. In some cases, you may need to be treated with IV antibiotics that are given by a home health care provider who visits your home.  · If your health care provider finds a specific cause of infection, you may be switched to the antibiotics that work best against those particular bacteria.  · If a fungal infection is found, your medicine will be changed to an antifungal medicine.  · If the fever goes away in 3-5 days, you may have to take medicine for about 7 days. If the fever is not responding, you may have to take medicine longer.  · You may have to stop taking any medicine that could be causing neutropenic fever.  · If you have neutropenic fever from cancer treatment drugs (chemotherapy), you may need to take a type of medicine called white blood cell growth factors. This medicine can help prevent fever.  HOME CARE INSTRUCTIONS  · Only take medicines as directed by your health care provider.  ¨ If you are being treated with oral antibiotics at home, you may need to return to your health care provider every day to have your CBC checked. You may have to do this until your fever responds.  ¨ Take your antibiotics as  directed. Make sure you finish them even if you start to feel better.  · Preventing infection is important when you have neutropenia. Here are some ways to prevent infections:  ¨ Avoid sick friends and family members.  ¨ Wash your hands often.  ¨ Do not eat uncooked or undercooked meats.  ¨ Wash all fruits and vegetables.  ¨ Do not eat or drink unpasteurized dairy products.  ¨ Get regular dental care, and maintain good dental hygiene.  ¨ Get a flu shot. Ask your health care provider whether you need any other vaccines.  ¨ Wear gloves when gardening.  · Follow up with your health care provider as directed.  SEEK MEDICAL CARE IF:  · You have chills.  · You have a fever.  · You have signs or symptoms of infection.  SEEK IMMEDIATE MEDICAL CARE IF:  · You have trouble breathing.  · You have chest pain.  This information is not intended to replace advice given to you by your health care provider. Make sure you discuss any questions you have with your health care provider.  Document Released: 12/23/2014 Document Reviewed: 12/23/2014  [employer] Interactive Patient Education © 2017 [employer]    Pancytopenia  Introduction  Pancytopenia is a condition in which there is an abnormally low amount (deficiency) of the following blood cells:  · Red blood cells (RBCs). Having too few RBCs is called anemia.  · White blood cells (WBCs). Having too few WBCs is called leukopenia.  · Cells that help the blood clot (platelets). Having too few platelets is called thrombocytopenia.  Cells that become blood cells (stem cells) are made in the soft tissue inside the bones (bone marrow). All blood cells have a limited lifespan. Blood cells are constantly replaced with new blood cells from the bone marrow. Pancytopenia can be caused by any condition or disease that:  · Destroys the ability of bone marrow to make blood cells.  · Causes bone marrow to make blood cells that cannot survive after they leave the bone marrow.  What are the  causes?  There are many possible causes of this condition. In some cases, the cause is not known. Common causes include:  · A disease that causes bone marrow to make immature blood cells (megaloblastic anemia).  · A blood disorder that makes bone marrow unable to produce enough new RBCs (aplastic anemia or bone marrow failure).  · An enlarged spleen (hypersplenism). An enlarged spleen can trap blood cells and destroy them faster than they can be replaced.  · Inherited diseases of the blood or bone marrow.  · Cancers that affect bone marrow.  · Certain medicines, such as:  ¨ Chemotherapy.  ¨ Medicines that reduce the activity of the immune system (immunosuppressant medicines).  · Exposure to radiation.  · Severe infections.  What increases the risk?  The following factors may make you more likely to develop this condition:  · Being 30?50 years old.  · Being a man.  · Having a family history of a blood or bone marrow disease.  · Having certain conditions, such as:  ¨ Alcohol use disorder.  ¨ HIV (human immunodeficiency virus) or AIDS (acquired immunodeficiency syndrome).  ¨ Cancer.  ¨ Conditions in which the body’s disease-fighting system attacks normal tissues (autoimmune diseases).  What are the signs or symptoms?  Symptoms of this condition vary depending on the cause and may include:  · Anemia.  · Weakness.  · Shortness of breath.  · Unusual bruising and bleeding.  · Frequent infections.  · Fatigue.  · Fever.  · Pale skin.  · Bone pain.  · Night sweats.  · Weight loss.  · Headache.  · Dizziness.  · Feeling unusually cold.  How is this diagnosed?  This condition may be diagnosed based on:  · Your symptoms.  · Your medical history.  · A physical exam.  · Removal of a sample of bone marrow to be examined under a microscope (biopsy). This is done by inserting a needle into a bone to remove fluid and cells (aspiration).  · A complete blood count (CBC). This is a group of tests that measures characteristics of WBCs,  RBCs, and platelets.  · A peripheral blood smear. This test examines your blood under a microscope to provide information about drugs and diseases that affect RBCs, WBCs, and platelets.  · Reticulocyte count. This is a test that measures the amount of new or immature RBCs (reticulocytes) that are made by your bone marrow.  · Imaging studies of your spleen or liver, such as X-rays.  · A test to measure your vitamin B12 level.  · Tests for viruses.  How is this treated?  Treatment for this condition depends on the cause. Treatment may include:  · Immunosuppressant medicines.  · Antibiotic medicine.  · Vitamin B12. This may be given as a treatment for megaloblastic anemia.  · Medicines that help the bone marrow make blood cells (bone marrow stimulating drugs).  · A bone marrow transplant.  · Receiving donated blood through an IV tube (blood transfusion).  · A procedure to remove your spleen (splenectomy). This may be done as a treatment for hypersplenism.  Follow these instructions at home:  Caring for Your Body   · Wash your hands often with soap and water. If soap and water are not available, use hand .  · Brush your teeth twice a day, and floss at least once a day. It is recommended that you visit the dentist every six months.  · Stay up to date on your vaccinations, including a yearly (annual) flu shot. Ask your health care provider which vaccines you should get, such as a vaccine against pneumonia.  Medicines  · Take over-the-counter and prescription medicines only as told by your health care provider.  · If you were prescribed an antibiotic medicine, take it as told by your health care provider. Do not stop taking the antibiotic even if you start to feel better.  General instructions  · Work with your health care provider to manage your condition and educate yourself about your condition.  · Do not participate in contact sports or dangerous activities. Ask your health care provider what activities are  safe for you.  · Follow food safety recommendations as told by your health care provider.  · During cold and flu season, avoid crowded places and avoid contact with people who are sick. Flu season is typically between the months of October and May.  · Keep all follow-up visits as told by your health care provider. This is important.  Contact a health care provider if:  · You have a fever.  · You bruise or bleed easily.  · You are dizzy.  · You feel unusually weak or tired.  Get help right away if:  · You have bleeding that does not stop.  · You have wheezing or shortness of breath.  · You have chest pain.  These symptoms may represent a serious problem that is an emergency. Do not wait to see if the symptoms will go away. Get medical help right away. Call your local emergency services (911 in the [place] Do not drive yourself to the hospital.   This information is not intended to replace advice given to you by your health care provider. Make sure you discuss any questions you have with your health care provider.  Document Released: 01/13/2017 Document Revised: 05/25/2017 Document Reviewed: 01/13/2016  © 2017 [employer]  Leukopenia  Leukopenia is a condition in which you have a low number of white blood cells. White blood cells help your body fight infections. The number of white blood cells in the body varies from person to person. Leukopenia is usually defined as having fewer than 4,000 white blood cells in 1 microliter of blood.  There are five types of white blood cells. Two types make up most of your white blood cell count. These are neutrophils and lymphocytes. When your level of neutrophils is low, it is called neutropenia. When your lymphocytes are low, it is called lymphocytopenia. Neutropenia is the most dangerous type of leukopenia because it can lead to dangerous infections.  CAUSES   Most white blood cells are made in the soft tissue inside your bones (bone marrow). Conditions that damage or suppress bone  marrow are the most common causes of leukopenia. These include:  · Medicine or X-ray treatments for cancer.  · Serious infections.  · Cancer of the white blood cells (leukemia or myeloma).  · Medicines, including antibiotics, cardiac drugs, steroids, and those used to treat rheumatoid arthritis.  Leukopenia also happens when white blood cells are destroyed after leaving your bone marrow. Causes may include:  · Liver disease.  · Diseases of the immune system (autoimmune disease).  · Vitamin B deficiencies.  SIGNS AND SYMPTOMS  One of the most common signs of leukopenia, especially severe neutropenia, is having a lot of bacterial infections. Different infections have different symptoms. An infection in your lungs may cause coughing. A urinary tract infection may cause frequent urination and a burning sensation. You may also get infections of the blood, skin, rectum, throat, sinus, or ear.  General signs and symptoms of leukopenia include:  · Fever.  · Fatigue.  · Swollen glands (lymph nodes).  · Painful mouth ulcers.  · Gum disease.  DIAGNOSIS   Your health care provider can diagnose leukopenia based on a physical exam and the results of lab tests. During a physical exam, your health care provider will feel for swollen lymph nodes and check whether your spleen is enlarged. Your spleen is an organ on the left side of your body that stores white blood cells. Tests that may be done include:  · A complete blood count. This blood test counts each type of white cell.  · Bone marrow aspiration. Some bone marrow is removed to be checked under a microscope.  · Lymph node biopsy. Some lymph node tissue is removed to be checked under a microscope.  · Other types of blood tests or imaging tests.  TREATMENT   Treatment of leukopenia depends on the cause. Some common treatments include:  · Antibiotics for bacterial infections.  · No longer taking medicines that may cause leukopenia.  · Vitamin B supplements.  · Medicines to  stimulate neutrophil production (hematopoietic growth factors) for neutropenia.  HOME CARE INSTRUCTIONS   Preventing infection is important if you have leukopenia.  · Avoid sick friends and family members.  · Wash your hands often.  · Do not eat uncooked or undercooked meats.  · Wash fruits and vegetables.  · Do not eat or drink unpasteurized dairy products.  · Get regular dental care, and maintain good dental hygiene.  · Keep all follow-up appointments.  SEEK MEDICAL CARE IF:  · You have chills or a fever.  · You have signs or symptoms of infection.  SEEK IMMEDIATE MEDICAL CARE IF:  · You have a fever or persistent symptoms for more than 2-3 days.  · You have trouble breathing.  · You have chest pain.  MAKE SURE YOU:  · Understand these instructions.  · Will watch your condition.  · Will get help right away if you are not doing well or get worse.  This information is not intended to replace advice given to you by your health care provider. Make sure you discuss any questions you have with your health care provider.  Document Released: 12/23/2014 Document Reviewed: 12/23/2014  [employer] Interactive Patient Education © 2017 [employer]  Anemia, Nonspecific  Anemia is a condition in which the concentration of red blood cells or hemoglobin in the blood is below normal. Hemoglobin is a substance in red blood cells that carries oxygen to the tissues of the body. Anemia results in not enough oxygen reaching these tissues.  What are the causes?  Common causes of anemia include:  · Excessive bleeding. Bleeding may be internal or external. This includes excessive bleeding from periods (in women) or from the intestine.  · Poor nutrition.  · Chronic kidney, thyroid, and liver disease.  · Bone marrow disorders that decrease red blood cell production.  · Cancer and treatments for cancer.  · HIV, AIDS, and their treatments.  · Spleen problems that increase red blood cell destruction.  · Blood disorders.  · Excess destruction of  red blood cells due to infection, medicines, and autoimmune disorders.  What are the signs or symptoms?  · Minor weakness.  · Dizziness.  · Headache.  · Palpitations.  · Shortness of breath, especially with exercise.  · Paleness.  · Cold sensitivity.  · Indigestion.  · Nausea.  · Difficulty sleeping.  · Difficulty concentrating.  Symptoms may occur suddenly or they may develop slowly.  How is this diagnosed?  Additional blood tests are often needed. These help your health care provider determine the best treatment. Your health care provider will check your stool for blood and look for other causes of blood loss.  How is this treated?  Treatment varies depending on the cause of the anemia. Treatment can include:  · Supplements of iron, vitamin B12, or folic acid.  · Hormone medicines.  · A blood transfusion. This may be needed if blood loss is severe.  · Hospitalization. This may be needed if there is significant continual blood loss.  · Dietary changes.  · Spleen removal.  Follow these instructions at home:  Keep all follow-up appointments. It often takes many weeks to correct anemia, and having your health care provider check on your condition and your response to treatment is very important.  Get help right away if:  · You develop extreme weakness, shortness of breath, or chest pain.  · You become dizzy or have trouble concentrating.  · You develop heavy vaginal bleeding.  · You develop a rash.  · You have bloody or black, tarry stools.  · You faint.  · You vomit up blood.  · You vomit repeatedly.  · You have abdominal pain.  · You have a fever or persistent symptoms for more than 2-3 days.  · You have a fever and your symptoms suddenly get worse.  · You are dehydrated.  This information is not intended to replace advice given to you by your health care provider. Make sure you discuss any questions you have with your health care provider.  Document Released: 01/25/2006 Document Revised: 05/31/2017 Document  Reviewed: 06/13/2014  [employer] Interactive Patient Education © 2017 [employer]    Thrombocytopenia  Thrombocytopenia means that you have a low number of platelets in your blood. Platelets are tiny cells in the blood. When you bleed, they clump together at the cut or injury to stop the bleeding. This is called blood clotting. Not having enough platelets can cause bleeding problems.  Follow these instructions at home:  General instructions  · Check your skin and inside your mouth for bruises or blood as told by your doctor.  · Check to see if there is blood in your spit (sputum), pee (urine), and poop (stool). Do this as told by your doctor.  · Ask your doctor if you can drink alcohol.  · Take over-the-counter and prescription medicines only as told by your doctor.  · Tell all of your doctors that you have this condition. Be sure to tell your dentist and eye doctor too.  Activity  · Do not do activities that can cause bumps or bruises until your doctor says it is okay.  · Be careful not to cut yourself:  ¨ When you shave.  ¨ When you use scissors, needles, knives, or other tools.  · Be careful not to burn yourself:  ¨ When you use an iron.  ¨ When you cook.  Contact a doctor if:  · You have bruises and you do not know why.  Get help right away if:  · You are bleeding anywhere on your body.  · You have blood in your spit, pee, or poop.  This information is not intended to replace advice given to you by your health care provider. Make sure you discuss any questions you have with your health care provider.  Document Released: 12/06/2012 Document Revised: 08/20/2017 Document Reviewed: 06/20/2016  [employer] Interactive Patient Education © 2017 [employer]

## 2018-03-26 NOTE — ED NOTES
Pt RBC's infusing to port in left chest. Updated on plan of care. Denies any needs at this time. Will continue to monitor. [id number]. resp nonlabored. Will continue to monitor.